# Patient Record
Sex: FEMALE
[De-identification: names, ages, dates, MRNs, and addresses within clinical notes are randomized per-mention and may not be internally consistent; named-entity substitution may affect disease eponyms.]

---

## 2021-08-17 ENCOUNTER — RESULT REVIEW (OUTPATIENT)
Age: 36
End: 2021-08-17

## 2022-11-20 ENCOUNTER — APPOINTMENT (OUTPATIENT)
Dept: AFTER HOURS CARE | Facility: EMERGENCY ROOM | Age: 37
End: 2022-11-20

## 2022-11-20 DIAGNOSIS — B34.9 VIRAL INFECTION, UNSPECIFIED: ICD-10-CM

## 2022-11-20 PROBLEM — Z00.00 ENCOUNTER FOR PREVENTIVE HEALTH EXAMINATION: Status: ACTIVE | Noted: 2022-11-20

## 2022-11-20 PROCEDURE — 99204 OFFICE O/P NEW MOD 45 MIN: CPT | Mod: 95

## 2022-11-20 RX ORDER — POLYMYXIN B SULFATE AND TRIMETHOPRIM 10000; 1 [USP'U]/ML; MG/ML
10000-0.1 SOLUTION OPHTHALMIC
Qty: 1 | Refills: 0 | Status: ACTIVE | COMMUNITY
Start: 2022-11-20 | End: 1900-01-01

## 2022-11-20 RX ORDER — FLUTICASONE PROPIONATE 50 UG/1
50 SPRAY, METERED NASAL DAILY
Qty: 1 | Refills: 0 | Status: ACTIVE | COMMUNITY
Start: 2022-11-20 | End: 1900-01-01

## 2022-11-20 RX ORDER — BENZONATATE 200 MG/1
200 CAPSULE ORAL
Qty: 9 | Refills: 0 | Status: ACTIVE | COMMUNITY
Start: 2022-11-20 | End: 1900-01-01

## 2022-11-20 RX ORDER — AZITHROMYCIN 250 MG/1
250 TABLET, FILM COATED ORAL
Qty: 1 | Refills: 0 | Status: ACTIVE | COMMUNITY
Start: 2022-11-20 | End: 1900-01-01

## 2022-11-20 NOTE — HISTORY OF PRESENT ILLNESS
[Home] : at home, [unfilled] , at the time of the visit. [Other Location: e.g. Home (Enter Location, City,State)___] : at [unfilled] [Verbal consent obtained from patient] : the patient, [unfilled] [FreeTextEntry8] : 35 yo female with PMH Ulcerative colitis (entyvio) for evaluation of cold-like symptoms. Symptom onset friday\par Symptoms include: throat itchy, rhinorrhea, coughing, conjunctivitis, sneezing. Symptoms are progressively worsening. Has taken Nyquil without improvement. Denies chest pain, shortness of breath, nausea, vomiting, abd pain. Right eye is sore and achy with purulent drainage, vision is normal. \par Tolerating PO. \par +sick contact\par LMP: few months ago, birth control (genalafy)\par Allergies: messalamine\par Nonsmoker\par PMD: Dr. Nara Cárdenas\par Vaccinated, boosted\par does not wear glasses or contacts

## 2022-11-20 NOTE — PLAN
[With new medications prescribed] : Treat in place: with new medications prescribed [FreeTextEntry1] : 1. Rx polytrim - for poss super infection, tessalon (for cough), flonase (nasal congestion), azithromycin (please do not start taking this medication unless cough becomes severe)\par 2. Continue otc meds for symptoms management\par 3. if starting zpack for severe cough, please also take probiotic. Consider consulting with your GI\par 4.	Follow up with your PMD\par 5.	Monitor for red flag symptoms as discussed\par 6.	Home remedies for nasal congestion include steam shower, vicks. For cough and sore throat you can try honey tawana warm water.\par 7. Lab Order: covid pcr, flu swab

## 2022-11-20 NOTE — ASSESSMENT
[FreeTextEntry1] : viral symptoms without shortness of breath, chest pain. Tolerating PO. +conjunctivitis symptoms - +purulent drainage, itchy - without visual changes\par

## 2024-05-01 ENCOUNTER — TRANSCRIPTION ENCOUNTER (OUTPATIENT)
Age: 39
End: 2024-05-01